# Patient Record
Sex: FEMALE | Race: WHITE | URBAN - METROPOLITAN AREA
[De-identification: names, ages, dates, MRNs, and addresses within clinical notes are randomized per-mention and may not be internally consistent; named-entity substitution may affect disease eponyms.]

---

## 2024-08-26 ENCOUNTER — TELEPHONE (OUTPATIENT)
Dept: NEUROLOGY | Facility: CLINIC | Age: 34
End: 2024-08-26

## 2024-08-26 ENCOUNTER — OFFICE VISIT (OUTPATIENT)
Dept: NEUROLOGY | Facility: CLINIC | Age: 34
End: 2024-08-26
Payer: OTHER GOVERNMENT

## 2024-08-26 VITALS
RESPIRATION RATE: 16 BRPM | BODY MASS INDEX: 24.07 KG/M2 | DIASTOLIC BLOOD PRESSURE: 72 MMHG | WEIGHT: 141 LBS | SYSTOLIC BLOOD PRESSURE: 112 MMHG | HEART RATE: 74 BPM | OXYGEN SATURATION: 98 % | HEIGHT: 64 IN

## 2024-08-26 DIAGNOSIS — G43.709 CHRONIC MIGRAINE W/O AURA W/O STATUS MIGRAINOSUS, NOT INTRACTABLE: Primary | ICD-10-CM

## 2024-08-26 RX ORDER — BACLOFEN 20 MG
TABLET ORAL
COMMUNITY

## 2024-08-26 RX ORDER — IBUPROFEN 400 MG/1
800 TABLET, FILM COATED ORAL
COMMUNITY

## 2024-08-26 RX ORDER — CONJUGATED ESTROGENS 0.62 MG/G
CREAM VAGINAL
COMMUNITY
Start: 2024-07-03

## 2024-08-26 RX ORDER — RIMEGEPANT SULFATE 75 MG/75MG
TABLET, ORALLY DISINTEGRATING ORAL
COMMUNITY
Start: 2023-11-28

## 2024-08-26 RX ORDER — TRAZODONE HYDROCHLORIDE 50 MG/1
75 TABLET, FILM COATED ORAL
COMMUNITY
Start: 2024-06-14 | End: 2024-12-11

## 2024-08-26 RX ORDER — BETAMETHASONE DIPROPIONATE 0.5 MG/G
LOTION TOPICAL
COMMUNITY
Start: 2024-06-21

## 2024-08-26 NOTE — PROGRESS NOTES
HPI:    Patient ID: Rhett Gomez is a 34 year old female.    HPI    Rhett Gomez is a 34-year-old female who presented to establish care for chronic migraines  She moved to [place] from [place] where she was following at [facility] and was receiving Botox therapy for migraines. States treatment was very effective and would like to continue Botox therapy. Started having migraines after her last pregnancy    Headache history  Onset and course over time:    long standing history of menstrual migraine, increase after her last pregnancy  Had pre-eclampsia and started having severe headaches post pregnancy  Frequency:  8-10 headaches per month  Duration: Varies, last for days   Associated symptoms  Photophobia: +  Phonophobia:   +  Nausea/vomiting:+ nausea  Neck tightness/pain: +  Visual symptoms: sparkles in peripheral vision    Medication tried:  Topamax- ? Rash/hives   Propranolol and Verapamil     Current treatment: Nurtec as needed and Botox for prevention         HISTORY:  Past Medical History:    Hypertension during pregnancy (HCC)    Migraines      Past Surgical History:   Procedure Laterality Date    Removal gallbladder        No family history on file.   Social History     Socioeconomic History    Marital status:    Tobacco Use    Smoking status: Never    Smokeless tobacco: Never   Vaping Use    Vaping status: Never Used   Substance and Sexual Activity    Alcohol use: Yes     Comment: Socially    Drug use: Never   Other Topics Concern    Caffeine Concern Yes     Comment: Coffee    Exercise Yes     Social Determinants of Health      Received from [facility]    Social Network    Received from [facility]    Housing Stability        Review of Systems   Constitutional: Negative.    HENT: Negative.     Eyes: Negative.    Respiratory: Negative.     Cardiovascular: Negative.    Gastrointestinal: Negative.    Endocrine: Negative.    Genitourinary: Negative.    Musculoskeletal: Negative.     Skin: Negative.    Allergic/Immunologic: Negative.    Neurological:  Positive for headaches.   Hematological: Negative.    Psychiatric/Behavioral: Negative.     All other systems reviewed and are negative.           Current Outpatient Medications   Medication Sig Dispense Refill    NURTEC 75 MG Oral Tablet Dispersible See Instructions, Dissolve one tablet on tongue at onset of migraine headache, maximum of one tablet per 24 hours, # 8 EA, 5 total refill(s), Maintenance, migraine abortive therapy; PA Approved - [id number], Dissolve one tablet on tongue at onset of migraine headache, maximum of one tablet per 24 hours, Pharmacy: [facility]      traZODone 50 MG Oral Tab Take 1.5 tablets (75 mg total) by mouth.      Magnesium Oxide -Mg Supplement ([facility] MAGNESIUM) 500 MG Oral Tab       onabotulinumtoxinA (BOTOX) 100 units Injection Recon Soln       PREMARIN 0.625 MG/GM Vaginal Cream See Instructions, 0.5 g Vaginal 1-2x/week, # 30 g, 0 total refill(s), Maintenance, 0.5 g Vaginal 1-2x/week, Pharmacy: [facility]      ibuprofen 400 MG Oral Tab Take 2 tablets (800 mg total) by mouth.      betamethasone dipropionate 0.05 % External Lotion See Instructions, 1 appl(s) Topically to affected area daily as needed, # 20 mL, 3 total refill(s), Maintenance, 1 appl(s) Topically to affected area daily as needed, Pharmacy: [facility]       Allergies:  Allergies   Allergen Reactions    Azithromycin HIVES and RASH    Erythromycin HIVES and RASH     PHYSICAL EXAM:   Physical Exam  Blood pressure 112/72, pulse 74, resp. rate 16, height 64\", weight 141 lb (64 kg), SpO2 98%.    General Appearance: Well nourished, well developed, no apparent distress.   HEENT: Normocephalic and atraumatic. Normal sclera.   Cardiovascular: Normal rate, regular rhythm and normal heart sounds.    Pulmonary/Chest: Effort normal and breath sounds normal.   Abdominal: Soft. Bowel sounds are normal.   Skin: dry,  clean and intact  Ext: peripheral pulses present  Psych: normal mood and affect    Neurological:  Patient is awake, alert and oriented to person, place and time   Normal memory, attention/concentration, speech and language.    Cranial Nerves:   II: Visual acuity: normal  III: Pupils: equal, round, reactive to light  III,IV,VI: Extra Ocular Movements: intact  V: Facial sensation: intact  VII: Facial strength: intact  VIII: Hearing: intact  IX: Palate: intact  XI: Shoulder shrug: intact  XII: Tongue movement: normal    Motor: Normal tone. Strength is  5 out of 5 in all extremities bilaterally.  DTR: present    Sensory: Sensory examination is normal to light touch and pinprick     Coordination: Finger-to-nose normal bilaterally without evidence of dysmetria.    Gait: normal casual gait          TESTS/IMAGING:     No records available    ASSESSMENT/PLAN:       ICD-10-CM    1. Chronic migraine w/o aura w/o status migrainosus, not intractable  G43.709         Patient is a pleasant 34-year-old female presented to [facility] care for chronic migraines.  Patient was on Botox injections, last treatment was in June 2024    Continue Botox treatment will need prior authorization  Continue Nurtec as needed    Follow up in a month once Botox gets approved      Thank you for allowing us to participate in your patient's care.        Moira Snow MD  [facility]    This note was prepared using Dragon Medical voice recognition dictation software. As a result errors may occur. When identified these errors have been corrected. While every attempt is made to correct errors during dictation discrepancies may still exist         Meds This Visit:  Requested Prescriptions      No prescriptions requested or ordered in this encounter       Imaging & Referrals:  None     ID#[id number]

## 2024-08-26 NOTE — PROGRESS NOTES
Patient stated she is here because she just moved and she wants to continue her care.  Patient stated she is due for Botox for Migraines.

## 2024-08-26 NOTE — PATIENT INSTRUCTIONS
Refill policies:    Allow 2-3 business days for refills; controlled substances may take longer.  Contact your pharmacy at least 5 days prior to running out of medication and have them send an electronic request or submit request through the “request refill” option in your [employer] account.  Refills are not addressed on weekends; covering physicians do not authorize routine medications on weekends.  No narcotics or controlled substances are refilled after noon on Fridays or by on call physicians.  By law, narcotics must be electronically prescribed.  A 30 day supply with no refills is the maximum allowed.  If your prescription is due for a refill, you may be due for a follow up appointment.  To best provide you care, patients receiving routine medications need to be seen at least once a year.  Patients receiving narcotic/controlled substance medications need to be seen at least once every 3 months.  In the event that your preferred pharmacy does not have the requested medication in stock (e.g. Backordered), it is your responsibility to find another pharmacy that has the requested medication available.  We will gladly send a new prescription to that pharmacy at your request.    Scheduling Tests:    If your physician has ordered radiology tests such as MRI or CT scans, please contact Central Scheduling at [phone] right away to schedule the test.  Once scheduled, the [facility] Centralized Referral Team will work with your insurance carrier to obtain pre-certification or prior authorization.  Depending on your insurance carrier, approval may take 3-10 days.  It is highly recommended patients assure they have received an authorization before having a test performed.  If test is done without insurance authorization, patient may be responsible for the entire amount billed.      Precertification and Prior Authorizations:  If your physician has recommended that you have a procedure or additional testing performed the [facility]  Centralized Referral Team will contact your insurance carrier to obtain pre-certification or prior authorization.    You are strongly encouraged to contact your insurance carrier to verify that your procedure/test has been approved and is a COVERED benefit.  Although the [facility] Centralized Referral Team does its due diligence, the insurance carrier gives the disclaimer that \"Although the procedure is authorized, this does not guarantee payment.\"    Ultimately the patient is responsible for payment.   Thank you for your understanding in this matter.  Paperwork Completion:  If you require FMLA or disability paperwork for your recovery, please make sure to either drop it off or have it faxed to our office at [phone]. Be sure the form has your name and date of birth on it.  The form will be faxed to our Forms Department and they will complete it for you.  There is a 25$ fee for all forms that need to be filled out.  Please be aware there is a 10-14 day turnaround time.  You will need to sign a release of information (KARLENE) form if your paperwork does not come with one.  You may call the Forms Department with any questions at [phone].  Their fax number is [phone].

## 2024-08-26 NOTE — TELEPHONE ENCOUNTER
Botox Questionnaire:      Year of initial migraine onset?  2004  Does patient have more than 15 headache days a month?  yes   If yes, how many days monthly?  15  Do headaches last 4 hours or more per day?  yes  Have headaches persisted with this frequency for 3 months or more? yes    Any ER visits due to migraines?  no  If yes, provide date /?A  Disability due to headache? No  Has patient missed  Work?  yes 1  School? no    List two different abortive medications patient has tried, noting dates used, dosing, and response to each (effective, intolerant, contraindicated or failed)  May include NSAIDs, steriods, triptans, narcotic pain relievers, OTC preparations    1.  Tylenol  2.  Ibuprofen  3. Rizatriptan  4. Zomig  5. Tosymra  6. Nurtec  7. Medrol dose momo    List two different prophylactic medications from two different therapeutic drug classes that patient has tried, noting dates used, dosing, and response to each (effective, intolerant, contraindicated or failed)    1.  Topamax  2.  Propranolol  3.  Verapamil      Explained prior authorization timeline and process, including that her insurance may require trial and failure of additional medication and, if approved, potentially her  purchase of the medication to be administered in the office.

## 2024-08-30 NOTE — TELEPHONE ENCOUNTER
Called [facility] at [phone] to initiate prior authorization for Botox in the office.      Spoke to Rigo DIAZ   requires Prior Authorization [id number] no prior authorization is required.  Ref#[id number]    Started prior authorization over the phone.  Advised to fax clinicals to [phone]. Faxed, confirmation received.      This is buy and bill.

## 2024-09-11 ENCOUNTER — PATIENT MESSAGE (OUTPATIENT)
Dept: NEUROLOGY | Facility: CLINIC | Age: 34
End: 2024-09-11

## 2024-09-11 NOTE — TELEPHONE ENCOUNTER
From: Rhett Gomez  To: Moira Snow  Sent: 9/11/2024 2:28 PM CDT  Subject: Botox update    Just wanted to check in if there was an update with the insurance authorization for Botox?

## 2024-09-19 NOTE — TELEPHONE ENCOUNTER
Called [facility] at [phone] spoke to Jori ESCALANTE      She said botox is approved from 8/24/24-10/1/24.  I told her I had requested an authorization for the year.  Our schedules are very full and we probably won't even be able to get the patient in for an appointment before 10/1.      She changed the dates to 8/24/24-9/1/25 but now she said it has to go back to pending review.  May take 3 business days for a decision.

## 2024-09-20 ENCOUNTER — OFFICE VISIT (OUTPATIENT)
Dept: NEUROLOGY | Facility: CLINIC | Age: 34
End: 2024-09-20
Payer: OTHER GOVERNMENT

## 2024-09-20 VITALS
SYSTOLIC BLOOD PRESSURE: 100 MMHG | DIASTOLIC BLOOD PRESSURE: 68 MMHG | WEIGHT: 144 LBS | RESPIRATION RATE: 16 BRPM | HEART RATE: 78 BPM | BODY MASS INDEX: 25 KG/M2

## 2024-09-20 DIAGNOSIS — G43.709 CHRONIC MIGRAINE W/O AURA W/O STATUS MIGRAINOSUS, NOT INTRACTABLE: Primary | ICD-10-CM

## 2024-09-20 PROCEDURE — 64615 CHEMODENERV MUSC MIGRAINE: CPT | Performed by: OTHER

## 2024-09-20 NOTE — PROGRESS NOTES
Paperwork noting that patient may bear financial responsibility for procedure(s) performed in clinic today signed prior to proceeding with procedure(s).    Furthermore, patient notified that they should contact their insurer to verify that your procedure/test has been approved and is a COVERED benefit.  Although the [facility] staff does its due diligence, the insurance carrier gives the disclaimer that \"Although the procedure is authorized, this does not guarantee payment.\"    Ultimately the patient is responsible for payment.    Botox is:  [x] Buy and Bill  [] Patient Supplied      [x] I have discussed with patient that if their insurance changes they must contact the office right away with that information so that a new prior authorization can be completed.  Patient verbalized understanding that Botox cannot be performed without a current prior authorization in place with correct insurance.

## 2024-09-21 NOTE — PROCEDURES
Procedure: Botox injection -chemodenervation  Consent: obtained after explanation of procedure detail, benefits and risks     Indication:   -chronic migraine patient who suffers 15 or more days with headache lasting 4 hours a day or longer.     Procedure description:  -Chronic Migraine  Dilution is 100 units/2 ml with a final concentration of 5 units per 0.1 ml.  A sterile 30-gauge, 0.5 inch needle as 0.1 ml (5 units) injection per each site. Injections were divided across 7 specific head/neck muscle areas as specified in the table below. All muscles were injected bilaterally with half the number of injection sites administered to the left, and half to the right side of the head and neck. 25 units wasted.      Head/Neck Area Dose (number of sites)   Frontalis bilaterally 20 units divided in 4 sites    bilaterally 10 units divided in 2 sites   Procerus 5 unit in 1 site   Occipitalis bilaterally 30 units divided in 6 sites   Temporalis bilaterally 50 units divided in 8 sites   Trapezius bilaterally 30 units divided in 6 sites   Cervical Paraspinal bilaterally 30 units divided in 2 sites   Total Dose 175 units divided in 31 sites         The procedure was completed successfully without complication during and after the injections, and the patient tolerated well throughout the procedure.        The recommended re-treatment schedule should be ~12 weeks from today.

## 2024-10-23 NOTE — TELEPHONE ENCOUNTER
Called  at [phone] to check the status of the Botox authorization.      Per Ilda patient is ineligible for  as of 10/5/24.      Called patient left message to call back with new insurance information.

## 2024-10-30 ENCOUNTER — PATIENT MESSAGE (OUTPATIENT)
Dept: NEUROLOGY | Facility: CLINIC | Age: 34
End: 2024-10-30

## 2024-11-22 ENCOUNTER — TELEPHONE (OUTPATIENT)
Dept: NEUROLOGY | Facility: CLINIC | Age: 34
End: 2024-11-22

## 2024-11-22 NOTE — TELEPHONE ENCOUNTER
Called [facility] at [phone] to check if Botox requires prior authorization.  Spoke to Brianna MARTINEZ ref#[id number]  and [id number] do not require prior authorization.    Ok for buy and bill.

## 2024-12-20 NOTE — TELEPHONE ENCOUNTER
This is correct, please see my note below from 11/22/24.      Patient has appointment scheduled for 12/30/24.

## 2024-12-20 NOTE — TELEPHONE ENCOUNTER
Patient called back stating she called her insurance and she was told tbotox will not require prior auth.

## 2024-12-20 NOTE — TELEPHONE ENCOUNTER
Pt states she spoke to someone at [facility] who said she did need PA. However she called the same phone number as [place]. Pt is going to call again because she doesn't want any surprises later. She will obtain better contact information if she is told again no PA. Endorsed to PA.

## 2024-12-30 ENCOUNTER — OFFICE VISIT (OUTPATIENT)
Dept: NEUROLOGY | Facility: CLINIC | Age: 34
End: 2024-12-30
Payer: COMMERCIAL

## 2024-12-30 VITALS
DIASTOLIC BLOOD PRESSURE: 70 MMHG | HEART RATE: 98 BPM | BODY MASS INDEX: 25.44 KG/M2 | RESPIRATION RATE: 16 BRPM | OXYGEN SATURATION: 98 % | WEIGHT: 149 LBS | SYSTOLIC BLOOD PRESSURE: 112 MMHG | HEIGHT: 64 IN

## 2024-12-30 DIAGNOSIS — G43.709 CHRONIC MIGRAINE W/O AURA W/O STATUS MIGRAINOSUS, NOT INTRACTABLE: Primary | ICD-10-CM

## 2024-12-30 RX ORDER — TRAZODONE HYDROCHLORIDE 100 MG/1
100 TABLET ORAL NIGHTLY
COMMUNITY
Start: 2024-12-13

## 2024-12-30 RX ORDER — RIMEGEPANT SULFATE 75 MG/75MG
75 TABLET, ORALLY DISINTEGRATING ORAL AS NEEDED
Qty: 10 TABLET | Refills: 2 | Status: SHIPPED | OUTPATIENT
Start: 2024-12-30

## 2024-12-30 NOTE — PROCEDURES
Procedure: Botox injection -chemodenervation  Consent: obtained after explanation of procedure detail, benefits and risks     Indication:   -chronic migraine patient who suffers 15 or more days with headache lasting 4 hours a day or longer.     Procedure description:  -Chronic Migraine  Dilution is 100 units/2 ml with a final concentration of 5 units per 0.1 ml.  A sterile 30-gauge, 0.5 inch needle as 0.1 ml (5 units) injection per each site. Injections were divided across 7 specific head/neck muscle areas as specified in the table below. All muscles were injected bilaterally with half the number of injection sites administered to the left, and half to the right side of the head and neck. 35 units wasted.        Head/Neck Area Dose (number of sites)   Frontalis bilaterally 20 units divided in 4 sites    bilaterally 10 units divided in 2 sites   Procerus 5 unit in 1 site   Occipitalis bilaterally 30 units divided in 6 sites   Temporalis bilaterally 40 units divided in 8 sites   Trapezius bilaterally 30 units divided in 6 sites   Cervical Paraspinal bilaterally 30 units divided in 2 sites   Total Dose 165 units divided in 31 sites         The procedure was completed successfully without complication during and after the injections, and the patient tolerated well throughout the procedure.        The recommended re-treatment schedule should be ~12 weeks from today.

## 2024-12-30 NOTE — PATIENT INSTRUCTIONS
Refill policies:    Allow 2-3 business days for refills; controlled substances may take longer.  Contact your pharmacy at least 5 days prior to running out of medication and have them send an electronic request or submit request through the “request refill” option in your [employer] account.  Refills are not addressed on weekends; covering physicians do not authorize routine medications on weekends.  No narcotics or controlled substances are refilled after noon on Fridays or by on call physicians.  By law, narcotics must be electronically prescribed.  A 30 day supply with no refills is the maximum allowed.  If your prescription is due for a refill, you may be due for a follow up appointment.  To best provide you care, patients receiving routine medications need to be seen at least once a year.  Patients receiving narcotic/controlled substance medications need to be seen at least once every 3 months.  In the event that your preferred pharmacy does not have the requested medication in stock (e.g. Backordered), it is your responsibility to find another pharmacy that has the requested medication available.  We will gladly send a new prescription to that pharmacy at your request.    Scheduling Tests:    If your physician has ordered radiology tests such as MRI or CT scans, please contact Central Scheduling at [phone] right away to schedule the test.  Once scheduled, the [facility] Centralized Referral Team will work with your insurance carrier to obtain pre-certification or prior authorization.  Depending on your insurance carrier, approval may take 3-10 days.  It is highly recommended patients assure they have received an authorization before having a test performed.  If test is done without insurance authorization, patient may be responsible for the entire amount billed.      Precertification and Prior Authorizations:  If your physician has recommended that you have a procedure or additional testing performed the [facility]  Centralized Referral Team will contact your insurance carrier to obtain pre-certification or prior authorization.    You are strongly encouraged to contact your insurance carrier to verify that your procedure/test has been approved and is a COVERED benefit.  Although the [facility] Centralized Referral Team does its due diligence, the insurance carrier gives the disclaimer that \"Although the procedure is authorized, this does not guarantee payment.\"    Ultimately the patient is responsible for payment.   Thank you for your understanding in this matter.  Paperwork Completion:  If you require FMLA or disability paperwork for your recovery, please make sure to either drop it off or have it faxed to our office at [phone]. Be sure the form has your name and date of birth on it.  The form will be faxed to our Forms Department and they will complete it for you.  There is a 25$ fee for all forms that need to be filled out.  Please be aware there is a 10-14 day turnaround time.  You will need to sign a release of information (KARLENE) form if your paperwork does not come with one.  You may call the Forms Department with any questions at [phone].  Their fax number is [phone].

## 2024-12-30 NOTE — PROGRESS NOTES

## 2024-12-31 ENCOUNTER — TELEPHONE (OUTPATIENT)
Dept: NEUROLOGY | Facility: CLINIC | Age: 34
End: 2024-12-31

## 2024-12-31 NOTE — TELEPHONE ENCOUNTER
Initiated prior authorization for Nurtec 75mg through CoverMyMeds.    Key: [id number]    Awaiting determination

## 2025-04-09 ENCOUNTER — OFFICE VISIT (OUTPATIENT)
Dept: NEUROLOGY | Facility: CLINIC | Age: 35
End: 2025-04-09
Payer: COMMERCIAL

## 2025-04-09 VITALS
DIASTOLIC BLOOD PRESSURE: 64 MMHG | SYSTOLIC BLOOD PRESSURE: 110 MMHG | WEIGHT: 151.38 LBS | BODY MASS INDEX: 25.84 KG/M2 | HEART RATE: 74 BPM | HEIGHT: 64 IN | OXYGEN SATURATION: 95 % | RESPIRATION RATE: 20 BRPM

## 2025-04-09 DIAGNOSIS — G43.709 CHRONIC MIGRAINE W/O AURA W/O STATUS MIGRAINOSUS, NOT INTRACTABLE: Primary | ICD-10-CM

## 2025-04-09 PROCEDURE — 64615 CHEMODENERV MUSC MIGRAINE: CPT | Performed by: OTHER

## 2025-04-09 NOTE — PATIENT INSTRUCTIONS
Refill policies:    Allow 2-3 business days for refills; controlled substances may take longer.  Contact your pharmacy at least 5 days prior to running out of medication and have them send an electronic request or submit request through the “request refill” option in your [employer] account.  Refills are not addressed on weekends; covering physicians do not authorize routine medications on weekends.  No narcotics or controlled substances are refilled after noon on Fridays or by on call physicians.  By law, narcotics must be electronically prescribed.  A 30 day supply with no refills is the maximum allowed.  If your prescription is due for a refill, you may be due for a follow up appointment.  To best provide you care, patients receiving routine medications need to be seen at least once a year.  Patients receiving narcotic/controlled substance medications need to be seen at least once every 3 months.  In the event that your preferred pharmacy does not have the requested medication in stock (e.g. Backordered), it is your responsibility to find another pharmacy that has the requested medication available.  We will gladly send a new prescription to that pharmacy at your request.    Scheduling Tests:    If your physician has ordered radiology tests such as MRI or CT scans, please contact Central Scheduling at [phone] right away to schedule the test.  Once scheduled, the [facility] Centralized Referral Team will work with your insurance carrier to obtain pre-certification or prior authorization.  Depending on your insurance carrier, approval may take 3-10 days.  It is highly recommended patients assure they have received an authorization before having a test performed.  If test is done without insurance authorization, patient may be responsible for the entire amount billed.      Precertification and Prior Authorizations:  If your physician has recommended that you have a procedure or additional testing performed the [facility]  Centralized Referral Team will contact your insurance carrier to obtain pre-certification or prior authorization.    You are strongly encouraged to contact your insurance carrier to verify that your procedure/test has been approved and is a COVERED benefit.  Although the [facility] Centralized Referral Team does its due diligence, the insurance carrier gives the disclaimer that \"Although the procedure is authorized, this does not guarantee payment.\"    Ultimately the patient is responsible for payment.   Thank you for your understanding in this matter.  Paperwork Completion:  If you require FMLA or disability paperwork for your recovery, please make sure to either drop it off or have it faxed to our office at [phone]. Be sure the form has your name and date of birth on it.  The form will be faxed to our Forms Department and they will complete it for you.  There is a 25$ fee for all forms that need to be filled out.  Please be aware there is a 10-14 day turnaround time.  You will need to sign a release of information (KARLENE) form if your paperwork does not come with one.  You may call the Forms Department with any questions at [phone].  Their fax number is [phone].

## 2025-06-03 ENCOUNTER — TELEPHONE (OUTPATIENT)
Dept: NEUROLOGY | Facility: CLINIC | Age: 35
End: 2025-06-03

## 2025-06-03 NOTE — TELEPHONE ENCOUNTER
Called [facility] at [phone] to confirm that Botox still doesn't require a prior authorization.      Spoke to Jackeline FORDE ref#[id number] no prior auth req'd for  and [id number].

## 2025-07-09 ENCOUNTER — OFFICE VISIT (OUTPATIENT)
Dept: NEUROLOGY | Facility: CLINIC | Age: 35
End: 2025-07-09
Payer: COMMERCIAL

## 2025-07-09 VITALS
SYSTOLIC BLOOD PRESSURE: 102 MMHG | BODY MASS INDEX: 24.92 KG/M2 | RESPIRATION RATE: 16 BRPM | HEART RATE: 78 BPM | OXYGEN SATURATION: 98 % | HEIGHT: 64 IN | WEIGHT: 146 LBS | DIASTOLIC BLOOD PRESSURE: 70 MMHG

## 2025-07-09 DIAGNOSIS — G43.709 CHRONIC MIGRAINE W/O AURA W/O STATUS MIGRAINOSUS, NOT INTRACTABLE: Primary | ICD-10-CM

## 2025-07-09 PROCEDURE — 64615 CHEMODENERV MUSC MIGRAINE: CPT | Performed by: OTHER

## 2025-07-09 PROCEDURE — 99213 OFFICE O/P EST LOW 20 MIN: CPT | Performed by: OTHER

## 2025-07-09 RX ORDER — RIMEGEPANT SULFATE 75 MG/75MG
75 TABLET, ORALLY DISINTEGRATING ORAL AS NEEDED
Qty: 10 TABLET | Refills: 2 | Status: SHIPPED | OUTPATIENT
Start: 2025-07-09

## 2025-07-09 NOTE — PROGRESS NOTES
Paperwork noting that patient may bear financial responsibility for procedure(s) performed in clinic today signed prior to proceeding with procedure(s).    Furthermore, patient notified that they should contact their insurer to verify that your procedure/test has been approved and is a COVERED benefit.  Although the [facility] staff does its due diligence, the insurance carrier gives the disclaimer that \"Although the procedure is authorized, this does not guarantee payment.\"    Ultimately the patient is responsible for payment.    Botox is:  [x] Buy and Bill  [] Patient Supplied      Botox Reauthorization Questions:  Has the patient experienced a reduction in frequency of migraines since starting Botox? yes  If yes, by what percentage? 95%  Has the intensity of migraines decreased since starting Botox? yes  If yes, by what percentage? 95%    [x] I have discussed with patient that if their insurance changes they must contact the office right away with that information so that a new prior authorization can be completed.  Patient verbalized understanding that Botox cannot be performed without a current prior authorization in place with correct insurance.

## 2025-07-09 NOTE — PATIENT INSTRUCTIONS
Refill policies:    Allow 2-3 business days for refills; controlled substances may take longer.  Contact your pharmacy at least 5 days prior to running out of medication and have them send an electronic request or submit request through the “request refill” option in your [employer] account.  Refills are not addressed on weekends; covering physicians do not authorize routine medications on weekends.  No narcotics or controlled substances are refilled after noon on Fridays or by on call physicians.  By law, narcotics must be electronically prescribed.  A 30 day supply with no refills is the maximum allowed.  If your prescription is due for a refill, you may be due for a follow up appointment.  To best provide you care, patients receiving routine medications need to be seen at least once a year.  Patients receiving narcotic/controlled substance medications need to be seen at least once every 3 months.  In the event that your preferred pharmacy does not have the requested medication in stock (e.g. Backordered), it is your responsibility to find another pharmacy that has the requested medication available.  We will gladly send a new prescription to that pharmacy at your request.    Scheduling Tests:    If your physician has ordered radiology tests such as MRI or CT scans, please contact Central Scheduling at [phone] right away to schedule the test.  Once scheduled, the [facility] Centralized Referral Team will work with your insurance carrier to obtain pre-certification or prior authorization.  Depending on your insurance carrier, approval may take 3-10 days.  It is highly recommended patients assure they have received an authorization before having a test performed.  If test is done without insurance authorization, patient may be responsible for the entire amount billed.      Precertification and Prior Authorizations:  If your physician has recommended that you have a procedure or additional testing performed the [facility]  Centralized Referral Team will contact your insurance carrier to obtain pre-certification or prior authorization.    You are strongly encouraged to contact your insurance carrier to verify that your procedure/test has been approved and is a COVERED benefit.  Although the [facility] Centralized Referral Team does its due diligence, the insurance carrier gives the disclaimer that \"Although the procedure is authorized, this does not guarantee payment.\"    Ultimately the patient is responsible for payment.   Thank you for your understanding in this matter.  Paperwork Completion:  If you require FMLA or disability paperwork for your recovery, please make sure to either drop it off or have it faxed to our office at [phone]. Be sure the form has your name and date of birth on it.  The form will be faxed to our Forms Department and they will complete it for you.  There is a 25$ fee for all forms that need to be filled out.  Please be aware there is a 10-14 day turnaround time.  You will need to sign a release of information (KARLENE) form if your paperwork does not come with one.  You may call the Forms Department with any questions at [phone].  Their fax number is [phone].

## 2025-07-09 NOTE — PROCEDURES
Procedure: Botox injection -chemodenervation  Consent: obtained after explanation of procedure detail, benefits and risks     Indication:   -chronic migraine patient who suffers 15 or more days with headache lasting 4 hours a day or longer.     Procedure description:  -Chronic Migraine  Dilution is 100 units/2 ml with a final concentration of 5 units per 0.1 ml.  A sterile 30-gauge, 0.5 inch needle as 0.1 ml (5 units) injection per each site. Injections were divided across 7 specific head/neck muscle areas as specified in the table below. All muscles were injected bilaterally with half the number of injection sites administered to the left, and half to the right side of the head and neck.     Total dose: 175 units      25 units wasted.        Head/Neck Area Dose (number of sites)   Frontalis bilaterally 20 units divided in 4 sites    bilaterally 10 units divided in 2 sites   Procerus 5 unit in 1 site   Occipitalis bilaterally 30 units divided in 6 sites   Temporalis bilaterally 50 units divided in 8 sites   Trapezius bilaterally 30 units divided in 6 sites   Cervical Paraspinal bilaterally 30 units divided in 2 sites   Total Dose 175 units divided in 31 sites         The procedure was completed successfully without complication during and after the injections, and the patient tolerated well throughout the procedure.        The recommended re-treatment schedule should be ~12 weeks from today.

## 2025-07-09 NOTE — PROGRESS NOTES
HPI:    Patient ID: Rhett Gomez is a 34 year old female.    HPI    Rhett Gomez is a 34-year-old female who presented for follow up for chronic migraines  Stable, botox working fine. No new complaints      Headache history  Onset and course over time:    long standing history of menstrual migraine, increase after her last pregnancy  Had pre-eclampsia and started having severe headaches post pregnancy  Frequency:  8-10 headaches per month  Duration: Varies, last for days   Associated symptoms  Photophobia: +  Phonophobia:   +  Nausea/vomiting:+ nausea  Neck tightness/pain: +  Visual symptoms: sparkles in peripheral vision    Medication tried:  Topamax- ? Rash/hives   Propranolol and Verapamil     Current treatment: Nurtec as needed and Botox for prevention         HISTORY:  Past Medical History:    Hypertension during pregnancy (HCC)    Migraines      Past Surgical History:   Procedure Laterality Date    Removal gallbladder        No family history on file.   Social History     Socioeconomic History    Marital status:    Tobacco Use    Smoking status: Never    Smokeless tobacco: Never   Vaping Use    Vaping status: Never Used   Substance and Sexual Activity    Alcohol use: Yes     Comment: Socially    Drug use: Never   Other Topics Concern    Caffeine Concern Yes     Comment: Coffee    Exercise Yes     Social Drivers of Health      Received from [facility]    Housing Stability        Review of Systems   Constitutional: Negative.    HENT: Negative.     Eyes: Negative.    Respiratory: Negative.     Cardiovascular: Negative.    Gastrointestinal: Negative.    Endocrine: Negative.    Genitourinary: Negative.    Musculoskeletal: Negative.    Skin: Negative.    Allergic/Immunologic: Negative.    Neurological:  Positive for headaches.   Hematological: Negative.    Psychiatric/Behavioral: Negative.     All other systems reviewed and are negative.           Current Outpatient Medications    Medication Sig Dispense Refill    NURTEC 75 MG Oral Tablet Dispersible Take 75 mg by mouth as needed. 10 tablet 2    traZODone 100 MG Oral Tab Take 1 tablet (100 mg total) by mouth nightly.      Magnesium Oxide -Mg Supplement ([facility] MAGNESIUM) 500 MG Oral Tab       onabotulinumtoxinA (BOTOX) 100 units Injection Recon Soln       PREMARIN 0.625 MG/GM Vaginal Cream See Instructions, 0.5 g Vaginal 1-2x/week, # 30 g, 0 total refill(s), Maintenance, 0.5 g Vaginal 1-2x/week, Pharmacy: [facility]      ibuprofen 400 MG Oral Tab Take 2 tablets (800 mg total) by mouth.      betamethasone dipropionate 0.05 % External Lotion See Instructions, 1 appl(s) Topically to affected area daily as needed, # 20 mL, 3 total refill(s), Maintenance, 1 appl(s) Topically to affected area daily as needed, Pharmacy: [facility]       Allergies:  Allergies   Allergen Reactions    Azithromycin HIVES and RASH    Erythromycin HIVES and RASH    Shellfish HIVES    Fluoxetine DIZZINESS     PHYSICAL EXAM:   Physical Exam  Blood pressure 102/70, pulse 78, resp. rate 16, height 64\", weight 146 lb (66.2 kg), SpO2 98%.    General Appearance: Well nourished, well developed, no apparent distress.   HEENT: Normocephalic and atraumatic. Normal sclera.   Cardiovascular: Normal rate, regular rhythm and normal heart sounds.    Pulmonary/Chest: Effort normal and breath sounds normal.   Abdominal: Soft. Bowel sounds are normal.   Skin: dry, clean and intact  Ext: peripheral pulses present  Psych: normal mood and affect    Neurological:  Patient is awake, alert and oriented to person, place and time   Normal memory, attention/concentration, speech and language.    Cranial Nerves:   II: Visual acuity: normal  III: Pupils: equal, round, reactive to light  III,IV,VI: Extra Ocular Movements: intact  V: Facial sensation: intact  VII: Facial strength: intact  VIII: Hearing: intact  IX: Palate: intact  XI: Shoulder shrug: intact  XII:  Tongue movement: normal    Motor: Normal tone. Strength is  5 out of 5 in all extremities bilaterally.  DTR: present    Sensory: Sensory examination is normal to light touch and pinprick     Coordination: Finger-to-nose normal bilaterally without evidence of dysmetria.    Gait: normal casual gait          ASSESSMENT/PLAN:       ICD-10-CM    1. Chronic migraine w/o aura w/o status migrainosus, not intractable  G43.709         Patient presents for follow up and for Botox injections    Continue Botox treatment   Continue Nurtec as needed    Follow up in about 12 weeks      Thank you for allowing us to participate in your patient's care.  Orders Placed This Encounter    NURTEC 75 MG Oral Tablet Dispersible     Sig: Take 75 mg by mouth as needed.     Dispense:  10 tablet     Refill:  2           Moira Snow MD  [facility] [place]    This note was prepared using Dragon Medical voice recognition dictation software. As a result errors may occur. When identified these errors have been corrected. While every attempt is made to correct errors during dictation discrepancies may still exist         Meds This Visit:  Requested Prescriptions     Signed Prescriptions Disp Refills    NURTEC 75 MG Oral Tablet Dispersible 10 tablet 2     Sig: Take 75 mg by mouth as needed.       Imaging & Referrals:  None     ID#[id number]